# Patient Record
(demographics unavailable — no encounter records)

---

## 2025-07-22 NOTE — ASSESSMENT
[FreeTextEntry1] :   79-year-old female for evaluation as post of left distal radius fracture.  Patient reports on 7/18/2025 she fell on her outstretched left hand and wrist.  She was seen in the ER where x-rays taken from an acute closed displaced impacted and slightly volarly angulated left distal radius fracture.  Her fracture was reduced in the ER and she was placed in a splint.  Post splinting and reduction x-rays reveal mild improvement in the alignment of fracture pattern.  She was advised follow-up in orthopedic specialist.  Physical examination left wrist: Splint fitting the patient well.  Good range of motion of fingers outside of the splint.  Sensations intact distally.  Neuro vas intact distally.  Structure of the splint is not well-maintained.  X-rays of the left wrist were retaken in the office today reveal an acute closed impacted and slightly volarly angulated left distal radius fracture.  No subluxations or dislocations.  TX: X-rays were discussed in depth.  The fracture alignment has improved status post reduction from the ER.  Patient was remained in her splint in an effort to not undo her recent reduction.  She understands that the fracture is slightly impacted and volarly angulated and is not in perfect alignment.  We discussed operative versus nonoperative management.  Let the patient know that I will consult with our hand surgeons.  For now, we will provide a follow-up in 1 week for repeat x-rays and further discussion.  She will likely be transitioned into a fiberglass cast.  Red flag symptoms were discussed. I recommended anti-inflammatory medication. Patient agrees to taking Advil/Ibuprofen OTC as needed for pain. Benefits discussed. Confirmed no contraindication to NSAIDs. I recommended patient rest, ice, compress, and elevate the regularly. Encouraged activity modification as tolerable. Encouraged gentle range of motion to avoid stiffness. All questions and concerns addressed to patient's satisfaction. Patient expresses full understanding of treatment plan.

## 2025-07-30 NOTE — ASSESSMENT
[FreeTextEntry1] :  The patient is being seen today for her left wrist. She fell on the wrist on 7/19 and broke her wrist. She has some pain in the wrist with motion of her fingers. She was in a splint that was removed today. She is accompanied by a family member who is translating for her.   left wrist: Deformity visualized at the wrist, tender palpation along the distal radius, decreased range of motion of fingers secondary to stiffness, neurovascular intact   x-ray left wrist 3 views shows a volar Weathers's distal radius fracture  I discussed with the patient and the family that the nature of the fracture is one that would warrant surgery to restore length, alignment, and rotation. We discussed the r/b/a of surgical and nonsurgical treatment options.  The patient/family understand that without surgery they are at increased risk of arthritis, pain, loss of motion, loss of strength, ulnar loading/outer wrist pain and deformity.  The patient/family would like to avoid surgery understanding the risks. They understand that they can change their mind and I would be amenable to fixing the fracture primarily up to three weeks from the time of injury.  After that time, if they wanted fixation, I would let the fracture heal, see how they do, and perform malunion surgery if they are still interested. A cast was applied.  The importance of ice and elevation were discussed with the patient.  The risks were also discussed such as compartment syndrome and skin breakdown.  They were instructed to never put foreign objects down the cast.  Patients should call for increasing pain, worsening swelling, numbness, extremity discoloration, or any other concerns.  She will follow up in 3 weeks for cast off, new images and for reevaluation.